# Patient Record
Sex: MALE | Race: WHITE | NOT HISPANIC OR LATINO | Employment: UNEMPLOYED | URBAN - METROPOLITAN AREA
[De-identification: names, ages, dates, MRNs, and addresses within clinical notes are randomized per-mention and may not be internally consistent; named-entity substitution may affect disease eponyms.]

---

## 2023-02-28 ENCOUNTER — HOSPITAL ENCOUNTER (EMERGENCY)
Facility: HOSPITAL | Age: 28
Discharge: HOME | End: 2023-02-28
Attending: STUDENT IN AN ORGANIZED HEALTH CARE EDUCATION/TRAINING PROGRAM
Payer: OTHER GOVERNMENT

## 2023-02-28 VITALS
HEART RATE: 73 BPM | HEIGHT: 71 IN | OXYGEN SATURATION: 99 % | BODY MASS INDEX: 23.94 KG/M2 | TEMPERATURE: 98 F | WEIGHT: 171 LBS | RESPIRATION RATE: 16 BRPM | SYSTOLIC BLOOD PRESSURE: 126 MMHG | DIASTOLIC BLOOD PRESSURE: 64 MMHG

## 2023-02-28 DIAGNOSIS — S80.812A ABRASION OF ANTERIOR LOWER LEG, LEFT, INITIAL ENCOUNTER: Primary | ICD-10-CM

## 2023-02-28 DIAGNOSIS — M79.606 LEG PAIN: ICD-10-CM

## 2023-02-28 PROCEDURE — 25000003 PHARM REV CODE 250

## 2023-02-28 PROCEDURE — 99283 EMERGENCY DEPT VISIT LOW MDM: CPT

## 2023-02-28 RX ORDER — BACITRACIN ZINC, NEOMYCIN SULFATE, AND POLYMYXIN B SULFATE 400; 3.5; 5 [IU]/G; MG/G; [IU]/G
OINTMENT TOPICAL 2 TIMES DAILY
Qty: 14 G | Refills: 0 | Status: SHIPPED | OUTPATIENT
Start: 2023-02-28 | End: 2023-03-10

## 2023-02-28 RX ADMIN — BACITRACIN ZINC, NEOMYCIN, POLYMYXIN B 1 EACH: 400; 3.5; 5 OINTMENT TOPICAL at 05:02

## 2023-02-28 NOTE — ED PROVIDER NOTES
Encounter Date: 2/28/2023       History     Chief Complaint   Patient presents with    Leg Injury     The patient reports that he was at work and was hit in the leg with a metal piece on a moving fork life. Denies taking any medication for pain.      Gregory Dean is a 28 y/o male with no pertinent PMH presenting to the emergency department today c/o L shin pain after being hit by a fork-lift ramp. Pt states the ramp hit his L shin and he started to experience immediate pain but was ambulatory afterwards. Pt states pain is 4/10 and is localized to the site of injury. Pt states pain is worse with plantar flexion and better with resting his leg. Pt denies falling, hitting his head or LOC. Pt is not on any anticoagulant and denies any fever, CP, SOB, N/V, focal weakness, numbness, or tingling.     The history is provided by the patient. No  was used.   Review of patient's allergies indicates:  No Known Allergies  History reviewed. No pertinent past medical history.  History reviewed. No pertinent surgical history.  History reviewed. No pertinent family history.  Social History     Tobacco Use    Smoking status: Never    Smokeless tobacco: Never   Substance Use Topics    Drug use: Never     Review of Systems   Constitutional:  Negative for fever.   HENT:  Negative for sore throat.    Respiratory:  Negative for shortness of breath.    Cardiovascular:  Negative for chest pain.   Gastrointestinal:  Negative for nausea.   Genitourinary:  Negative for dysuria.   Musculoskeletal:  Negative for back pain.   Skin:  Positive for wound. Negative for rash.   Neurological:  Negative for weakness.   Hematological:  Does not bruise/bleed easily.     Physical Exam     Initial Vitals [02/28/23 1552]   BP Pulse Resp Temp SpO2   126/64 73 16 98.4 °F (36.9 °C) 99 %      MAP       --         Physical Exam    Nursing note and vitals reviewed.  Constitutional: Vital signs are normal. He appears well-developed and  well-nourished. He is cooperative. He does not appear ill. No distress.   HENT:   Head: Normocephalic and atraumatic.   Right Ear: External ear normal.   Left Ear: External ear normal.   Nose: Nose normal.   Eyes: Conjunctivae and EOM are normal.   Neck: Phonation normal.   Normal range of motion.  Cardiovascular:  Normal rate and regular rhythm.           No murmur heard.  Pulmonary/Chest: Effort normal. No respiratory distress.   Abdominal: Abdomen is flat. He exhibits no distension. There is no abdominal tenderness.   Musculoskeletal:      Cervical back: Normal range of motion.        Legs:      Neurological: He is alert and oriented to person, place, and time. GCS eye subscore is 4. GCS verbal subscore is 5. GCS motor subscore is 6.   Skin: Skin is warm and dry. Capillary refill takes less than 2 seconds. No rash noted.       ED Course   Procedures  Labs Reviewed - No data to display       Imaging Results              X-Ray Tibia Fibula 2 View Left (Final result)  Result time 02/28/23 16:48:30      Final result by Isreal Zuleta MD (02/28/23 16:48:30)                   Impression:      1. Focal soft tissue swelling in the mid aspect of the pretibial region.  No acute displaced fracture, dislocation or suspicious osseous lesion.      Electronically signed by: Isreal Zuleta  Date:    02/28/2023  Time:    16:48               Narrative:    EXAMINATION:  XR TIBIA FIBULA 2 VIEW LEFT    CLINICAL HISTORY:  Pain in leg, unspecified    TECHNIQUE:  2 views of the left tibia and fibula    COMPARISON:  None    FINDINGS:  No acute displaced fracture, dislocation or suspicious osseous lesion. Anatomic alignment is maintained.    Focal soft tissue swelling in the mid aspect of the pretibial region.                                    X-Rays:   Independently Interpreted Readings:   Other Readings:  X-ray tib-fib left:  No acute fracture or dislocation.  Mild soft tissue swelling on the anterior aspect of the mid lower  leg.  Medications   neomycin-bacitracnZn-polymyxnB packet 1 each (has no administration in time range)     Medical Decision Making:   Initial Assessment:   27-year-old male presenting to the emergency department with a chief complaint of left leg injury.  Differential Diagnosis:   Differential diagnosis includes but is not limited to contusion, abrasion, laceration, dislocation, fracture, or soft tissue injury of the left knee, shin, ankle, or foot.  Independently Interpreted Test(s):   I have ordered and independently interpreted X-rays - see prior notes.  Clinical Tests:   Radiological Study: Ordered and Reviewed  ED Management:  Patient presenting to the emergency department with a left lower leg injury.  Sustained by hitting his shin on the ramp of a forklift.  He has been ambulatory since the time of the injury.  Reporting 4/10 pain.  Neurovascularly intact distal to injury.  Abrasion noted over the anterior aspect of the left shin.  X-rays negative for any fracture or dislocation.  Abrasion cleaned and dressed with antibiotic ointment and a sterile dressing.  Patient tolerated the procedure well.  Will discharge home with topical antibiotic ointment.  Discussed wound care.  Patient voices understanding.  Safe for discharge.    Return precautions were discussed, all patient questions were answered, and the patient was agreeable to the plan of care.  He was discharged home in stable condition and will follow up with his primary care provider or return to the emergency department if his symptoms worsen or do not improve.                         Clinical Impression:   Final diagnoses:  [M79.606] Leg pain  [S80.812A] Abrasion of anterior lower leg, left, initial encounter (Primary)        ED Disposition Condition    Discharge Stable          ED Prescriptions       Medication Sig Dispense Start Date End Date Auth. Provider    neomycin-bacitracin-polymyxin (NEOSPORIN, [id number],) ointment Apply topically 2 (two)  times daily. for 10 days 14 g 2/28/2023 3/10/2023 Ramon Conn PA-C          Follow-up Information       Follow up With Specialties Details Why Contact Info    [facility]  Schedule an appointment as soon as possible for a visit  As needed [address] [facility] [address]  [phone]      [facility] Emergency Dept Emergency Medicine Go to  If symptoms worsen [address] [facility] [id number] [phone]             Ramon Conn PA-C  02/28/23 1745

## 2023-02-28 NOTE — DISCHARGE INSTRUCTIONS
Thank you for coming to our Emergency Department today. It is important to remember that some problems are difficult to diagnose and may not be found during your first visit. Be sure to follow up with your primary care doctor and review any labs/imaging that was performed with them. If you do not have a primary care doctor, you may contact the one listed on your discharge paperwork or you may also call the [facility] Appointment Desk at [phone] to schedule an appointment with one.     All medications may potentially have side effects and it is impossible to predict which medications may give you side effects. If you feel that you are having a negative effect of any medication you should immediately stop taking them and seek medical attention.    Return to the ER with any questions/concerns, new/concerning symptoms, worsening or failure to improve. Do not drive or make any important decisions for 24 hours if you have received any pain medications, sedatives or mood altering drugs during your ER visit.

## 2023-02-28 NOTE — FIRST PROVIDER EVALUATION
"Medical screening examination initiated.  I have conducted a focused provider triage encounter, findings are as follows:    Brief history of present illness:  27-year-old male no previous past medical history presents with left leg pain.  Patient states his symptoms began just prior to ED arrival when he was struck in the left lower extremity with a metal piece of a forklift. Denies other injury.  Tetanus up-to-date.    Vitals:    02/28/23 1552   BP: 126/64   BP Location: Right arm   Patient Position: Sitting   Pulse: 73   Resp: 16   Temp: 98.4 °F (36.9 °C)   TempSrc: Oral   SpO2: 99%   Weight: 77.6 kg (171 lb)   Height: 5' 11" (1.803 m)       Pertinent physical exam:  Superior abrasion to the anterior lateral aspect of the skin overlying the distal left tib-fib Non-toxic appearing, no respiratory distress, no focal neurologic deficits, ambulatory without difficulty or assistance.       Brief workup plan:  X-rays of the left tib-fib    Preliminary workup initiated; this workup will be continued and followed by the physician or advanced practice provider that is assigned to the patient when roomed.  "